# Patient Record
Sex: MALE | Race: ASIAN | NOT HISPANIC OR LATINO | URBAN - METROPOLITAN AREA
[De-identification: names, ages, dates, MRNs, and addresses within clinical notes are randomized per-mention and may not be internally consistent; named-entity substitution may affect disease eponyms.]

---

## 2017-08-04 ENCOUNTER — OFFICE VISIT - HEALTHEAST (OUTPATIENT)
Dept: FAMILY MEDICINE | Facility: CLINIC | Age: 22
End: 2017-08-04

## 2017-08-04 DIAGNOSIS — Z00.00 HEALTH CARE MAINTENANCE: ICD-10-CM

## 2017-08-04 LAB
CHOLEST SERPL-MCNC: 245 MG/DL
FASTING STATUS PATIENT QL REPORTED: YES
HDLC SERPL-MCNC: 49 MG/DL
LDLC SERPL CALC-MCNC: 178 MG/DL
TRIGL SERPL-MCNC: 92 MG/DL

## 2017-08-04 ASSESSMENT — MIFFLIN-ST. JEOR: SCORE: 1575.25

## 2017-11-10 ENCOUNTER — COMMUNICATION - HEALTHEAST (OUTPATIENT)
Dept: FAMILY MEDICINE | Facility: CLINIC | Age: 22
End: 2017-11-10

## 2017-11-10 DIAGNOSIS — Z11.1 SCREENING-PULMONARY TB: ICD-10-CM

## 2017-11-15 ENCOUNTER — AMBULATORY - HEALTHEAST (OUTPATIENT)
Dept: LAB | Facility: CLINIC | Age: 22
End: 2017-11-15

## 2017-11-15 ENCOUNTER — AMBULATORY - HEALTHEAST (OUTPATIENT)
Dept: NURSING | Facility: CLINIC | Age: 22
End: 2017-11-15

## 2017-11-15 ENCOUNTER — COMMUNICATION - HEALTHEAST (OUTPATIENT)
Dept: TELEHEALTH | Facility: CLINIC | Age: 22
End: 2017-11-15

## 2017-11-15 ENCOUNTER — COMMUNICATION - HEALTHEAST (OUTPATIENT)
Dept: FAMILY MEDICINE | Facility: CLINIC | Age: 22
End: 2017-11-15

## 2017-11-15 DIAGNOSIS — Z11.1 SCREENING-PULMONARY TB: ICD-10-CM

## 2017-11-15 DIAGNOSIS — Z23 NEED FOR IMMUNIZATION AGAINST INFLUENZA: ICD-10-CM

## 2017-11-29 ENCOUNTER — AMBULATORY - HEALTHEAST (OUTPATIENT)
Dept: FAMILY MEDICINE | Facility: CLINIC | Age: 22
End: 2017-11-29

## 2017-11-29 ENCOUNTER — COMMUNICATION - HEALTHEAST (OUTPATIENT)
Dept: FAMILY MEDICINE | Facility: CLINIC | Age: 22
End: 2017-11-29

## 2017-11-29 DIAGNOSIS — Z01.84 IMMUNITY STATUS TESTING: ICD-10-CM

## 2017-12-08 ENCOUNTER — AMBULATORY - HEALTHEAST (OUTPATIENT)
Dept: LAB | Facility: CLINIC | Age: 22
End: 2017-12-08

## 2017-12-08 DIAGNOSIS — Z01.84 IMMUNITY STATUS TESTING: ICD-10-CM

## 2021-05-31 VITALS — HEIGHT: 65 IN | BODY MASS INDEX: 24.84 KG/M2 | WEIGHT: 149.1 LBS

## 2021-06-12 NOTE — PROGRESS NOTES
"  Subjective:      Lore Summers is a 22 y.o. male who presents today for Physical exam.   Lore is not , does not have children. Works at [employer]  Denies any past medical history or medications.   He overall feels well, denies any acute concerns.   He is fasting today. Due for Td.     Reviewed past medical/surgical history, family history, social history, medications and updated chart below.     No Known Allergies  History reviewed. No pertinent past medical history.  History reviewed. No pertinent surgical history.  Social History     Social History     Marital status: Single     Spouse name: N/A     Number of children: 0     Years of education: N/A     Occupational History     [employer]      Social History Main Topics     Smoking status: Never Smoker     Smokeless tobacco: Never Used     Alcohol use Yes      Comment: 1-2/month     Drug use: No     Sexual activity: Yes     Partners: Female     Other Topics Concern     Not on file     Social History Narrative     No narrative on file     Family History   Problem Relation Age of Onset     Seizures Mother      Colon cancer Father      Colon cancer Maternal Grandmother      No current outpatient prescriptions on file.     No current facility-administered medications for this visit.      Patient Active Problem List    Diagnosis Date Noted     Health care maintenance 08/04/2017       Review of Systems   Constitutional: Negative.   HENT: Negative.   Eyes: Negative.   Respiratory: Negative.   Cardiovascular: Negative.   Gastrointestinal: Negative.   Endocrine: Negative.   Genitourinary: Negative.   Musculoskeletal: Negative.   Skin: Negative.   Allergic/Immunologic: Negative.   Neurological: Negative.   Hematological: Negative.   Psychiatric/Behavioral: Negative.     Objective:    Physical Exam   /72 (Patient Site: Left Arm, Patient Position: Sitting, Cuff Size: Adult Regular)  Pulse 60  Ht 5' 4.5\" (1.638 m)  Wt 149 lb 1.6 oz (67.6 kg)  BMI 25.2 " kg/m2    Constitutional: Alert and oriented x3, well nourished without any acute distress  HENT:   Right Ear: External ear normal.   Left Ear: External ear normal.   Nose: Nose normal.   Mouth/Throat: Oropharynx is clear and moist.   Eyes: Conjunctivae and EOM are normal. Pupils are equal, round, and reactive to light. Right eye exhibits no discharge. Left eye exhibits no discharge.   Neck: No thyromegaly present.   Lymphadenopathy: Without palpable lymphadenopathy  Cardiovascular: Normal S1 and S2. Regular rate, rhythm and no murmur, rubs or gallops. Palpable distal pulses bilaterally.  Pulmonary/Chest: Normal effort. Lungs clear to auscultation in all lobes. Without wheezing, rhonchi or crackles.    Abdominal: Soft, non tenderness. There is no rebound or guarding. Bowel sounds x4. Without organomegaly. No CVA tenderness.  Musculoskeletal: 5/5 strength  And full ROM in all extremities. No joint swelling or deformity  Neurological: Cranial nerves intact, without deficit. Without numbness, tingling or paresthesia. Normal reflexes  Skin: Dry and intact; without rashes or lesions.   Psychiatric: Normal mood and affect.         Assessment/Plan:    1. Health care maintenance  I did order fasting labs and will follow up with results once received.   Tetanus updated.   We discussed healthy lifestyle, nutrition, cardiovascular risk reduction, self care, safety, self testicular exams, sunscreen, and seatbelt screening.  Recommended annual physical exam or sooner for any acute concerns.   Patient agreed and appeared pleased with plan    - Td, Adult, Adsorbed (blue label)  - Lipid Cascade FASTING  - HM2(CBC w/o Differential)  - Vitamin D, Total (25-Hydroxy)  - Comprehensive Metabolic Panel      Diana Mehta CNP  8/4/2017

## 2021-06-16 PROBLEM — Z00.00 HEALTH CARE MAINTENANCE: Status: ACTIVE | Noted: 2017-08-04
